# Patient Record
Sex: MALE | Race: WHITE | NOT HISPANIC OR LATINO | URBAN - METROPOLITAN AREA
[De-identification: names, ages, dates, MRNs, and addresses within clinical notes are randomized per-mention and may not be internally consistent; named-entity substitution may affect disease eponyms.]

---

## 2024-02-17 ENCOUNTER — OFFICE VISIT (OUTPATIENT)
Dept: URGENT CARE | Facility: CLINIC | Age: 54
End: 2024-02-17
Payer: COMMERCIAL

## 2024-02-17 VITALS
WEIGHT: 315 LBS | HEART RATE: 88 BPM | TEMPERATURE: 98.3 F | RESPIRATION RATE: 24 BRPM | OXYGEN SATURATION: 97 % | SYSTOLIC BLOOD PRESSURE: 175 MMHG | DIASTOLIC BLOOD PRESSURE: 111 MMHG | BODY MASS INDEX: 47.42 KG/M2

## 2024-02-17 DIAGNOSIS — H10.31 ACUTE CONJUNCTIVITIS OF RIGHT EYE, UNSPECIFIED ACUTE CONJUNCTIVITIS TYPE: ICD-10-CM

## 2024-02-17 DIAGNOSIS — L73.9 FOLLICULITIS: Primary | ICD-10-CM

## 2024-02-17 PROCEDURE — 99203 OFFICE O/P NEW LOW 30 MIN: CPT | Performed by: PHYSICIAN ASSISTANT

## 2024-02-17 PROCEDURE — 1036F TOBACCO NON-USER: CPT | Performed by: PHYSICIAN ASSISTANT

## 2024-02-17 RX ORDER — PREDNISONE 20 MG/1
20 TABLET ORAL 2 TIMES DAILY
Qty: 6 TABLET | Refills: 0 | Status: SHIPPED | OUTPATIENT
Start: 2024-02-17 | End: 2024-02-20

## 2024-02-17 RX ORDER — OFLOXACIN 3 MG/ML
1 SOLUTION/ DROPS OPHTHALMIC 4 TIMES DAILY
Qty: 5 ML | Refills: 0 | Status: SHIPPED | OUTPATIENT
Start: 2024-02-17 | End: 2024-02-22

## 2024-02-17 RX ORDER — DOXYCYCLINE 100 MG/1
100 CAPSULE ORAL 2 TIMES DAILY
Qty: 20 CAPSULE | Refills: 0 | Status: SHIPPED | OUTPATIENT
Start: 2024-02-17 | End: 2024-02-27

## 2024-02-18 PROBLEM — H10.31 ACUTE CONJUNCTIVITIS OF RIGHT EYE: Status: ACTIVE | Noted: 2024-02-18

## 2024-02-18 PROBLEM — L73.9 FOLLICULITIS: Status: ACTIVE | Noted: 2024-02-18

## 2024-02-18 ASSESSMENT — ENCOUNTER SYMPTOMS
EYE DISCHARGE: 1
PHOTOPHOBIA: 0
EYE REDNESS: 1
EYE ITCHING: 1
EYE PAIN: 0

## 2024-02-18 NOTE — PROGRESS NOTES
Subjective   Patient ID: Attila Hylton is a 53 y.o. male.    Patient is a 53-year-old male who arrives for evaluation of 2 separate complaints.  Patient states he noted matting and discharge with irritation to his right eye which has developed over the past 2 days.  Patient also reports a raised, red rash to his bilateral thorax.  Patient denies injury or foreign body to his right eye and states that his vision is intact and unchanged.  Patient does not wear contact lenses or prescription eyeglasses.  Patient states that his left eye is asymptomatic.  Patient reports that his red rash is confined to the skin of his thorax and states that the remainder of the skin to his BSA is otherwise clear.  Patient denies history of MRSA.      Rash  Pertinent negatives include no eye pain.   The following portions of the chart were reviewed this encounter and updated as appropriate:       Review of Systems   Eyes:  Positive for discharge, redness and itching. Negative for photophobia, pain and visual disturbance.   Skin:  Positive for rash.   All other systems reviewed and are negative.  Objective   Physical Exam  Vitals and nursing note reviewed.   Constitutional:       Appearance: Normal appearance. He is normal weight.   HENT:      Head: Normocephalic and atraumatic.      Right Ear: External ear normal.      Left Ear: External ear normal.      Nose: Nose normal.      Mouth/Throat:      Mouth: Mucous membranes are moist.      Pharynx: Oropharynx is clear.   Eyes:      General:         Right eye: Discharge present.         Left eye: No discharge.      Extraocular Movements: Extraocular movements intact.      Pupils: Pupils are equal, round, and reactive to light.      Comments: Mild erythema is noted to the right conjunctiva.  There is slight matting and discharge noted on exam.  Right superior and inferior eyelids are clear without erythema or edema.  Right periorbital skin is clear without erythema or edema.  Exam of the  left conjunctiva, eyelids and periorbital skin is unremarkable.  Pupils are equal, round and reactive to light and accommodation and the patient demonstrates full extraocular range of motion bilaterally.   Cardiovascular:      Pulses: Normal pulses.   Pulmonary:      Effort: Pulmonary effort is normal.      Breath sounds: Normal breath sounds.   Skin:     General: Skin is warm and dry.      Capillary Refill: Capillary refill takes less than 2 seconds.      Findings: Erythema and rash present. No bruising or lesion.      Comments: Multiple raised erythematous papules are noted to the bilateral mid axillary skin right greater than left.  There are also multiple whiteheads noted.  No pustules, vesicles or other skin lesions are noted.  There is no bleeding, serous or purulent fluid to the sites.  No induration or fluctuance is noted to the sites.   Neurological:      General: No focal deficit present.      Mental Status: He is alert and oriented to person, place, and time.   Psychiatric:         Mood and Affect: Mood normal.         Behavior: Behavior normal.         Thought Content: Thought content normal.         Judgment: Judgment normal.     Assessment/Plan   Physical exam findings as noted above.  Patient was provided with prescriptions for doxycycline 100 mg and prednisone 20 mg for the folliculitis and erythematous rash.  Patient was provided with prescription for ofloxacin 0.3% ophthalmic solution and instructions for application were discussed.  Patient verbalizes clear understanding of same.    CLINICAL IMPRESSION:  Acute Folliculitis Thorax;  Acute Conjunctivitis Right Eye    Diagnoses and all orders for this visit:  Folliculitis  -     doxycycline (Monodox) 100 mg capsule; Take 1 capsule (100 mg) by mouth 2 times a day for 10 days. Take with at least 8 ounces (large glass) of water, do not lie down for 30 minutes after  -     predniSONE (Deltasone) 20 mg tablet; Take 1 tablet (20 mg) by mouth 2 times a day  for 3 days.  Acute conjunctivitis of right eye, unspecified acute conjunctivitis type  -     ofloxacin (Ocuflox) 0.3 % ophthalmic solution; Administer 1 drop into the right eye 4 times a day for 5 days.

## 2024-07-25 PROBLEM — Z86.010 SURVEILLANCE DUE TO PRIOR COLONIC NEOPLASIA: Status: ACTIVE | Noted: 2024-07-26

## 2024-07-26 ENCOUNTER — AMBULATORY SURGICAL CENTER (OUTPATIENT)
Dept: URBAN - METROPOLITAN AREA SURGERY 12 | Facility: SURGERY | Age: 54
End: 2024-07-26
Payer: MEDICARE

## 2024-07-26 VITALS
OXYGEN SATURATION: 90 % | OXYGEN SATURATION: 93 % | SYSTOLIC BLOOD PRESSURE: 126 MMHG | DIASTOLIC BLOOD PRESSURE: 71 MMHG | HEART RATE: 67 BPM | OXYGEN SATURATION: 91 % | SYSTOLIC BLOOD PRESSURE: 130 MMHG | RESPIRATION RATE: 13 BRPM | HEART RATE: 69 BPM | RESPIRATION RATE: 13 BRPM | HEART RATE: 64 BPM | RESPIRATION RATE: 20 BRPM | HEIGHT: 70 IN | DIASTOLIC BLOOD PRESSURE: 50 MMHG | DIASTOLIC BLOOD PRESSURE: 75 MMHG | WEIGHT: 315 LBS | HEART RATE: 65 BPM | HEART RATE: 59 BPM | HEART RATE: 65 BPM | DIASTOLIC BLOOD PRESSURE: 50 MMHG | SYSTOLIC BLOOD PRESSURE: 148 MMHG | WEIGHT: 315 LBS | HEART RATE: 67 BPM | SYSTOLIC BLOOD PRESSURE: 148 MMHG | OXYGEN SATURATION: 92 % | RESPIRATION RATE: 10 BRPM | OXYGEN SATURATION: 90 % | HEART RATE: 57 BPM | RESPIRATION RATE: 12 BRPM | DIASTOLIC BLOOD PRESSURE: 61 MMHG | RESPIRATION RATE: 12 BRPM | SYSTOLIC BLOOD PRESSURE: 127 MMHG | DIASTOLIC BLOOD PRESSURE: 95 MMHG | WEIGHT: 315 LBS | DIASTOLIC BLOOD PRESSURE: 73 MMHG | DIASTOLIC BLOOD PRESSURE: 87 MMHG | SYSTOLIC BLOOD PRESSURE: 104 MMHG | SYSTOLIC BLOOD PRESSURE: 136 MMHG | SYSTOLIC BLOOD PRESSURE: 166 MMHG | RESPIRATION RATE: 5 BRPM | SYSTOLIC BLOOD PRESSURE: 166 MMHG | TEMPERATURE: 98.1 F | HEIGHT: 70 IN | DIASTOLIC BLOOD PRESSURE: 71 MMHG | TEMPERATURE: 98.1 F | SYSTOLIC BLOOD PRESSURE: 130 MMHG | OXYGEN SATURATION: 93 % | SYSTOLIC BLOOD PRESSURE: 126 MMHG | RESPIRATION RATE: 23 BRPM | HEART RATE: 57 BPM | DIASTOLIC BLOOD PRESSURE: 86 MMHG | DIASTOLIC BLOOD PRESSURE: 74 MMHG | HEART RATE: 65 BPM | DIASTOLIC BLOOD PRESSURE: 87 MMHG | RESPIRATION RATE: 20 BRPM | DIASTOLIC BLOOD PRESSURE: 88 MMHG | TEMPERATURE: 98.1 F | DIASTOLIC BLOOD PRESSURE: 95 MMHG | RESPIRATION RATE: 15 BRPM | DIASTOLIC BLOOD PRESSURE: 87 MMHG | SYSTOLIC BLOOD PRESSURE: 148 MMHG | SYSTOLIC BLOOD PRESSURE: 136 MMHG | SYSTOLIC BLOOD PRESSURE: 127 MMHG | SYSTOLIC BLOOD PRESSURE: 130 MMHG | DIASTOLIC BLOOD PRESSURE: 75 MMHG | SYSTOLIC BLOOD PRESSURE: 149 MMHG | DIASTOLIC BLOOD PRESSURE: 54 MMHG | HEART RATE: 70 BPM | SYSTOLIC BLOOD PRESSURE: 149 MMHG | SYSTOLIC BLOOD PRESSURE: 134 MMHG | RESPIRATION RATE: 18 BRPM | HEART RATE: 59 BPM | OXYGEN SATURATION: 97 % | OXYGEN SATURATION: 96 % | SYSTOLIC BLOOD PRESSURE: 136 MMHG | RESPIRATION RATE: 18 BRPM | RESPIRATION RATE: 17 BRPM | DIASTOLIC BLOOD PRESSURE: 54 MMHG | RESPIRATION RATE: 10 BRPM | SYSTOLIC BLOOD PRESSURE: 139 MMHG | HEART RATE: 59 BPM | RESPIRATION RATE: 20 BRPM | HEART RATE: 64 BPM | SYSTOLIC BLOOD PRESSURE: 104 MMHG | DIASTOLIC BLOOD PRESSURE: 61 MMHG | SYSTOLIC BLOOD PRESSURE: 149 MMHG | HEART RATE: 69 BPM | DIASTOLIC BLOOD PRESSURE: 88 MMHG | DIASTOLIC BLOOD PRESSURE: 74 MMHG | SYSTOLIC BLOOD PRESSURE: 134 MMHG | OXYGEN SATURATION: 97 % | DIASTOLIC BLOOD PRESSURE: 50 MMHG | DIASTOLIC BLOOD PRESSURE: 73 MMHG | SYSTOLIC BLOOD PRESSURE: 127 MMHG | HEART RATE: 67 BPM | SYSTOLIC BLOOD PRESSURE: 126 MMHG | HEART RATE: 57 BPM | OXYGEN SATURATION: 95 % | HEART RATE: 70 BPM | OXYGEN SATURATION: 95 % | HEART RATE: 69 BPM | OXYGEN SATURATION: 94 % | HEART RATE: 70 BPM | RESPIRATION RATE: 15 BRPM | RESPIRATION RATE: 18 BRPM | DIASTOLIC BLOOD PRESSURE: 75 MMHG | DIASTOLIC BLOOD PRESSURE: 74 MMHG | RESPIRATION RATE: 17 BRPM | SYSTOLIC BLOOD PRESSURE: 139 MMHG | HEART RATE: 68 BPM | DIASTOLIC BLOOD PRESSURE: 61 MMHG | OXYGEN SATURATION: 90 % | RESPIRATION RATE: 13 BRPM | OXYGEN SATURATION: 92 % | RESPIRATION RATE: 17 BRPM | OXYGEN SATURATION: 96 % | HEART RATE: 68 BPM | HEART RATE: 64 BPM | RESPIRATION RATE: 5 BRPM | SYSTOLIC BLOOD PRESSURE: 139 MMHG | DIASTOLIC BLOOD PRESSURE: 95 MMHG | DIASTOLIC BLOOD PRESSURE: 54 MMHG | OXYGEN SATURATION: 92 % | RESPIRATION RATE: 23 BRPM | RESPIRATION RATE: 15 BRPM | DIASTOLIC BLOOD PRESSURE: 88 MMHG | SYSTOLIC BLOOD PRESSURE: 166 MMHG | RESPIRATION RATE: 5 BRPM | DIASTOLIC BLOOD PRESSURE: 86 MMHG | OXYGEN SATURATION: 95 % | OXYGEN SATURATION: 96 % | RESPIRATION RATE: 12 BRPM | DIASTOLIC BLOOD PRESSURE: 73 MMHG | HEART RATE: 68 BPM | RESPIRATION RATE: 23 BRPM | SYSTOLIC BLOOD PRESSURE: 134 MMHG | OXYGEN SATURATION: 91 % | DIASTOLIC BLOOD PRESSURE: 86 MMHG | OXYGEN SATURATION: 91 % | DIASTOLIC BLOOD PRESSURE: 71 MMHG | SYSTOLIC BLOOD PRESSURE: 104 MMHG | HEIGHT: 70 IN | OXYGEN SATURATION: 97 % | OXYGEN SATURATION: 94 % | OXYGEN SATURATION: 93 % | OXYGEN SATURATION: 94 % | RESPIRATION RATE: 10 BRPM

## 2024-07-26 DIAGNOSIS — Z09 ENCOUNTER FOR FOLLOW-UP EXAMINATION AFTER COMPLETED TREATMEN: ICD-10-CM

## 2024-07-26 DIAGNOSIS — K64.8 OTHER HEMORRHOIDS: ICD-10-CM

## 2024-07-26 DIAGNOSIS — K57.30 DIVERTICULOSIS OF LARGE INTESTINE WITHOUT PERFORATION OR ABS: ICD-10-CM

## 2024-07-26 DIAGNOSIS — Z86.010 PERSONAL HISTORY OF COLONIC POLYPS: ICD-10-CM

## 2024-07-26 PROCEDURE — G0121 COLON CA SCRN NOT HI RSK IND: HCPCS | Performed by: INTERNAL MEDICINE
